# Patient Record
(demographics unavailable — no encounter records)

---

## 2025-03-20 NOTE — PLAN
[FreeTextEntry1] : check annual routine bloodwork- pt requests HIV screen medication reconciliation performed advised healthy diet/exercise discussed vaccines- had flu vaccine 11/24. Had covid vaccine in the fall.  Depression screen done & reviewed 5 minutes  HTN- BP stable. cont Losartan. cont to monitor BP at home Anxiety- pt asking about weaning off Lexapro, will decrease from 10 to 5 mg qd and observe. High chol- check lipids

## 2025-03-20 NOTE — HISTORY OF PRESENT ILLNESS
[FreeTextEntry1] : 56 yo female here for annual PE.  Pt taking Lexapro for anxiety, says she feels better and asking about weaning off.

## 2025-03-20 NOTE — HEALTH RISK ASSESSMENT
[Good] : ~his/her~  mood as  good [Monthly or less (1 pt)] : Monthly or less (1 point) [PHQ-2 Negative - No further assessment needed] : PHQ-2 Negative - No further assessment needed [Time Spent: ___ Minutes] : I spent [unfilled] minutes performing a depression screening for this patient. [Never] : Never [MammogramDate] : 1/25 [PapSmearDate] : 1/25 [BoneDensityDate] : 2/23 [ColonoscopyDate] : 3/20